# Patient Record
(demographics unavailable — no encounter records)

---

## 2025-04-07 NOTE — PHYSICAL EXAM
[de-identified] : Constitutional:  The patient appears well developed, well nourished.  Examination of patients ability to communicate functionally was normal.       Neurologic: Coordination is normal.  Alert and oriented to time, place and person.  No evidence of mood disorder, calm affect.       Back, including spine: Inspection of the thoracic/lumbar spine is as follows: No atrophy, erythema, ecchymosis, masses, skin discoloration, swelling and rashes.       Palpation of the thoracic/lumbar spine is as follows:right lumbar paraspinal spasm,  right lumbar paraspinal tenderness. no thoracic paraspinal spasm, no thoracic paraspinal tenderness, no tenderness over left sciatic nerve and no tenderness over right sciatic nerve.       Range of motion of the thoracic and lumbar spine is as follows in degrees: Diminished range of motion in all planes and Pain with lateral rotation. pain at extremes of flexion, stiffness at extremes of flexion, pain at extremes extension, stiffness at extremes extentions, stiffness with bending to the right and stiffness with bending to the left.  GUARDING SIGNIFICANT LOSS OF ROM  Lumbar Forward Flexion 5   Lumbar Extension 0     Lumbar Left Lateral Bending 0   Lumbar Right Lateral Bending 5    Lumbar Left Lateral Rotation 5    Lumbar Right Lateral Rotation 5      Strength Testing of the thoracic and lumbar spine is as follows: motor exam is 5/5 throughout both lower extremities with normal tone and motor exam is non-focal throughout both lower extremities       Special testing of the thoracic and lumbar spine is as follows: negative straight leg raise on left side, negative straight leg raise on right side, negative sitting straight leg raise on right and negative sitting straight leg raise on left       Neurological testing of the thoracic and lumbar spine is as follows: light touch is DIMINISHED GROSSLY TO THE LATERAL AND PLANTAR FOOT both lower extremities, achillies and patella reflexes are symetrical,       Gait and function is as follows: mildly antalgic gait.

## 2025-04-07 NOTE — DISCUSSION/SUMMARY
[de-identified] :  RAFAEL SRINIVASAN 56 year F was seen and evaluated in office today. Following evaluation, the natural history of the pathology was explained in full to the patient in layman's terms.   Several different treatment options were discussed and explained in full to the patient, along with specific risks and benefits of both surgical and non-surgical treatments. Nonsurgical options including but not limited to Corticosteroid Injection, Visco supplementation, Prescription anti-inflammatory medications (both steroidal and non-steroidal), activity modification, non-impact exercise, maintaining a healthy BMI, bracing, and icing were all reviewed. Surgical options including but not limited to arthroscopy, and joint replacement along with other indicated procedures were discussed.   Discussed risk of morbidity associated with proposed treatment plans. These risks include, but are not limited to, the risk associated with prescription strength medications and possible side effects of these medications which include GI hemorrhage with oral medications along with cardiac/renal issues with long term use. Risks with all pertinent surgical interventions as discussed with patient including infection, bleeding, anesthesia complications, NV injury, fracture, DVT, or heterotopic ossification.   Furthermore, discussed with RAFAEL that they could also delay any immediate treatment options and continue to observe and self-care for the discussed problem. Discussed Home Exercise Programs as well as Rest, Ice and elevation. Patient had ample time to ask any questions about todays visit and the diagnosis, and all questions were answered. Patient understands the plan going forward.   Lastly, based on this patient's presentation at our office, which is an orthopedic specialist office, this patient inherently / intrinsically has a risk of progression of symptoms/condition, as well as development and/or exacerbation of cardiovascular disease/conditions, obesity, DVT, worsening and chronic pain, and permanent loss of function, as well as decreased abilities to complete activities of daily life.   At this time, patient has significant multilevel degenerative changes in the Lumbar spine, along with complaints consistent with lumbar radiculopathy. Patient was advised on the diagnosis at length today and further discussed with patient that they should be evaluated by an orthopedic spine specialist.  Patient was advised to follow up as needed pending orthopedic spine specialist evaluation. Patient expressed understanding of this at this time.   At this time the patient reports that she has completed MRI of the lumbar spine at this time. Patients imaging and results are not present for review today. Discussed with patient that they should locate this study and follow up with Dr. Faria regarding this.
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
## 2025-04-07 NOTE — DATA REVIEWED
[CT Scan] : CT scan [Pelvis] : pelvis [Cervical Spine] : cervical spine [Thoracic Spine] : thoracic spine [FreeTextEntry1] : Images completed at [facility] 1/2025. Overall nonfocal, no osseous pathology noted, come mild degenerative changes in the Spine